# Patient Record
Sex: FEMALE | Race: WHITE
[De-identification: names, ages, dates, MRNs, and addresses within clinical notes are randomized per-mention and may not be internally consistent; named-entity substitution may affect disease eponyms.]

---

## 2019-07-02 ENCOUNTER — FORM ENCOUNTER (OUTPATIENT)
Age: 54
End: 2019-07-02

## 2019-08-08 ENCOUNTER — FORM ENCOUNTER (OUTPATIENT)
Age: 54
End: 2019-08-08

## 2021-08-16 ENCOUNTER — APPOINTMENT (OUTPATIENT)
Dept: BREAST CENTER | Facility: CLINIC | Age: 56
End: 2021-08-16

## 2022-10-10 PROBLEM — Z00.00 ENCOUNTER FOR PREVENTIVE HEALTH EXAMINATION: Status: ACTIVE | Noted: 2022-10-10

## 2024-05-29 ENCOUNTER — NON-APPOINTMENT (OUTPATIENT)
Age: 59
End: 2024-05-29

## 2024-06-11 DIAGNOSIS — Z86.69 PERSONAL HISTORY OF OTHER DISEASES OF THE NERVOUS SYSTEM AND SENSE ORGANS: ICD-10-CM

## 2024-06-11 DIAGNOSIS — Z87.891 PERSONAL HISTORY OF NICOTINE DEPENDENCE: ICD-10-CM

## 2024-06-12 DIAGNOSIS — R92.30 INCONCLUSIVE MAMMOGRAM: ICD-10-CM

## 2024-06-12 DIAGNOSIS — R92.2 INCONCLUSIVE MAMMOGRAM: ICD-10-CM

## 2024-06-12 DIAGNOSIS — N63.0 UNSPECIFIED LUMP IN UNSPECIFIED BREAST: ICD-10-CM

## 2024-06-13 ENCOUNTER — APPOINTMENT (OUTPATIENT)
Dept: BREAST CENTER | Facility: CLINIC | Age: 59
End: 2024-06-13
Payer: COMMERCIAL

## 2024-06-13 VITALS
DIASTOLIC BLOOD PRESSURE: 82 MMHG | HEART RATE: 81 BPM | SYSTOLIC BLOOD PRESSURE: 127 MMHG | HEIGHT: 62 IN | WEIGHT: 150 LBS | OXYGEN SATURATION: 97 % | BODY MASS INDEX: 27.6 KG/M2

## 2024-06-13 DIAGNOSIS — R92.8 OTHER ABNORMAL AND INCONCLUSIVE FINDINGS ON DIAGNOSTIC IMAGING OF BREAST: ICD-10-CM

## 2024-06-13 DIAGNOSIS — N60.12 DIFFUSE CYSTIC MASTOPATHY OF LEFT BREAST: ICD-10-CM

## 2024-06-13 DIAGNOSIS — R92.1 MAMMOGRAPHIC CALCIFICATION FOUND ON DIAGNOSTIC IMAGING OF BREAST: ICD-10-CM

## 2024-06-13 DIAGNOSIS — N60.11 DIFFUSE CYSTIC MASTOPATHY OF LEFT BREAST: ICD-10-CM

## 2024-06-13 DIAGNOSIS — Z80.3 FAMILY HISTORY OF MALIGNANT NEOPLASM OF BREAST: ICD-10-CM

## 2024-06-13 PROCEDURE — 99203 OFFICE O/P NEW LOW 30 MIN: CPT

## 2024-06-13 NOTE — HISTORY OF PRESENT ILLNESS
[FreeTextEntry1] : The patient is a 59-year-old nulliparous postmenopausal white female with a history of cerebral palsy who is wheelchair-bound.  She underwent menarche at age 14.  She underwent a supracervical hysterectomy and BSO in the past and has never taken any hormone replacement therapy.  She has a family history with her maternal aunt who had breast cancer in her 60s and a maternal great aunt who had breast cancer in her 50s.  She underwent 2 left breast needle core biopsies in the past which were benign.  She also underwent 2 right breast targeted ultrasound core biopsies in the 10:00 region showing sclerosing adenosis send in the right breast 12:00 region showing a complex fibroadenoma in November 2022 at [facility].  She has a history of fibrocystic breasts and comes in now after being lost to follow-up to reestablish care for routine breast cancer screening/surveillance.

## 2024-06-13 NOTE — PHYSICAL EXAM
[Normocephalic] : normocephalic [Atraumatic] : atraumatic [EOMI] : extra ocular movement intact [Supple] : supple [No Supraclavicular Adenopathy] : no supraclavicular adenopathy [No Cervical Adenopathy] : no cervical adenopathy [Examined in the supine and seated position] : examined in the supine and seated position [No dominant masses] : no dominant masses in right breast  [No dominant masses] : no dominant masses left breast [No Nipple Retraction] : no left nipple retraction [No Nipple Discharge] : no left nipple discharge [Breast Mass Right Breast ___cm] : no masses [Breast Mass Left Breast ___cm] : no masses [No Axillary Lymphadenopathy] : no left axillary lymphadenopathy [No Edema] : no edema [No Rashes] : no rashes [No Ulceration] : no ulceration [Breast Nipple Inversion] : nipples not inverted [Breast Nipple Retraction] : nipples not retracted [Breast Nipple Flattening] : nipples not flattened [Breast Nipple Fissures] : nipples not fissured [Breast Abnormal Lactation (Galactorrhea)] : no galactorrhea [Breast Abnormal Secretion Bloody Fluid] : no bloody discharge [Breast Abnormal Secretion Serous Fluid] : no serous discharge [Breast Abnormal Secretion Opalescent Fluid] : no milky discharge [de-identified] : The patient has cerebral palsy and is wheelchair-bound and has ptotic full C-cup breasts.  On palpation, I cannot feel any suspicious densities in either breast.  She has no axillary, supraclavicular, or cervical adenopathy.

## 2024-06-13 NOTE — ASSESSMENT
[FreeTextEntry1] : The patient is a 59-year-old nulliparous postmenopausal white female with a history of cerebral palsy who is wheelchair-bound.  She underwent menarche at age 14.  She underwent a supracervical hysterectomy and BSO in the past and has never taken any hormone replacement therapy.  She has a family history with her maternal aunt who had breast cancer in her 60s and a maternal great aunt who had breast cancer in her 50s.  She underwent 2 left breast needle core biopsies in the past which were benign.  She also underwent 2 right breast targeted ultrasound core biopsies in the 10:00 region showing sclerosing adenosis and in the right breast 12:00 region showing a complex fibroadenoma in November 2022 at [facility].  She has a history of fibrocystic mastopathy.  She has had some fairly stable bilateral complex cystic densities and calcifications.  She underwent her last diagnostic bilateral mammography and ultrasound which was reviewed from May 6, 2024 and performed at [facility] which showed heterogeneously dense breasts with extensive bilateral calcifications most numerous in the right breast upper outer quadrant which are stable as well as bilateral complex cystic densities for which bilateral diagnostic mammography and ultrasound is again being recommended in 6 months.  Possible MRI has also been suggested.  On exam today, she continues have ptotic D-cup breasts but I cannot feel any suspicious densities and no lymphadenopathy.  I reassured the patient that I believe she just continues have fibrocystic mastopathy with no suspicious clinical findings.  I do not believe the patient would be able to perform breast MRI since she is wheelchair-bound and tells me she cannot lay flat for extended period of time and she continues have some spastic movements due to her cerebral palsy.  I could consider contrast-enhanced mammography but for now I would just like to perform a diagnostic bilateral mammography and ultrasound again in November 2024.  I would like to see her again in 6 months in December 2024.  If her images and exam are unchanged, I think she can follow-up with me on a yearly basis and continue yearly mammography and ultrasound.  The patient tells me she was concerned and also did make an appointment at [facility] where she has a consultation as well.

## 2024-06-13 NOTE — HISTORY OF PRESENT ILLNESS
[FreeTextEntry1] : The patient is a 59-year-old nulliparous postmenopausal white female with a history of cerebral palsy who is wheelchair-bound.  She underwent menarche at age 14.  She underwent a supracervical hysterectomy and BSO in the past and has never taken any hormone replacement therapy.  She has a family history with her maternal aunt who had breast cancer in her 60s and a maternal great aunt who had breast cancer in her 50s.  She underwent 2 left breast needle core biopsies in the past which were benign.  She also underwent 2 right breast targeted ultrasound core biopsies in the 10:00 region showing sclerosing adenosis send in the right breast 12:00 region showing a complex fibroadenoma in November 2022 at [facility].  She has a history of fibrocystic breasts and comes in now after being lost to follow-up to reestablish care for routine breast cancer screening/surveillance.

## 2024-06-13 NOTE — REASON FOR VISIT
[Initial Eval - Existing Diagnosis] : an initial evaluation of an existing diagnosis [FreeTextEntry1] : The patient is a nulliparous postmenopausal white female with a history of cerebral palsy who is wheelchair-bound with a family history of breast cancer.  She has a history of fibrocystic breast and comes in to reestablish routine breast cancer screening/surveillance.

## 2024-06-13 NOTE — ADDENDUM
[FreeTextEntry1] : I spent greater than 75% of consultation in face-to-face counseling and coordination care in this patient with a history of fibrocystic mastopathy who has cerebral palsy and is wheelchair-bound with some findings on breast imaging requiring 6-month diagnostic follow-up.

## 2024-06-13 NOTE — PHYSICAL EXAM
[Normocephalic] : normocephalic [Atraumatic] : atraumatic [EOMI] : extra ocular movement intact [Supple] : supple [No Supraclavicular Adenopathy] : no supraclavicular adenopathy [No Cervical Adenopathy] : no cervical adenopathy [Examined in the supine and seated position] : examined in the supine and seated position [No dominant masses] : no dominant masses in right breast  [No dominant masses] : no dominant masses left breast [No Nipple Retraction] : no left nipple retraction [No Nipple Discharge] : no left nipple discharge [Breast Mass Right Breast ___cm] : no masses [Breast Mass Left Breast ___cm] : no masses [No Axillary Lymphadenopathy] : no left axillary lymphadenopathy [No Edema] : no edema [No Rashes] : no rashes [No Ulceration] : no ulceration [Breast Nipple Inversion] : nipples not inverted [Breast Nipple Retraction] : nipples not retracted [Breast Nipple Flattening] : nipples not flattened [Breast Nipple Fissures] : nipples not fissured [Breast Abnormal Lactation (Galactorrhea)] : no galactorrhea [Breast Abnormal Secretion Bloody Fluid] : no bloody discharge [Breast Abnormal Secretion Serous Fluid] : no serous discharge [Breast Abnormal Secretion Opalescent Fluid] : no milky discharge [de-identified] : The patient has cerebral palsy and is wheelchair-bound and has ptotic full D-cup breasts.  On palpation, I cannot feel any suspicious densities in either breast.  She has no axillary, supraclavicular, or cervical adenopathy.

## 2024-06-13 NOTE — PHYSICAL EXAM
[Normocephalic] : normocephalic [Atraumatic] : atraumatic [EOMI] : extra ocular movement intact [Supple] : supple [No Supraclavicular Adenopathy] : no supraclavicular adenopathy [No Cervical Adenopathy] : no cervical adenopathy [Examined in the supine and seated position] : examined in the supine and seated position [No dominant masses] : no dominant masses in right breast  [No dominant masses] : no dominant masses left breast [No Nipple Retraction] : no left nipple retraction [No Nipple Discharge] : no left nipple discharge [Breast Mass Right Breast ___cm] : no masses [Breast Mass Left Breast ___cm] : no masses [No Axillary Lymphadenopathy] : no left axillary lymphadenopathy [No Edema] : no edema [No Rashes] : no rashes [No Ulceration] : no ulceration [Breast Nipple Inversion] : nipples not inverted [Breast Nipple Retraction] : nipples not retracted [Breast Nipple Flattening] : nipples not flattened [Breast Nipple Fissures] : nipples not fissured [Breast Abnormal Lactation (Galactorrhea)] : no galactorrhea [Breast Abnormal Secretion Bloody Fluid] : no bloody discharge [Breast Abnormal Secretion Serous Fluid] : no serous discharge [Breast Abnormal Secretion Opalescent Fluid] : no milky discharge [de-identified] : The patient has cerebral palsy and is wheelchair-bound and has ptotic full D-cup breasts.  On palpation, I cannot feel any suspicious densities in either breast.  She has no axillary, supraclavicular, or cervical adenopathy.

## 2024-06-13 NOTE — ASSESSMENT
[FreeTextEntry1] : The patient is a 59-year-old nulliparous postmenopausal white female with a history of cerebral palsy who is wheelchair-bound.  She underwent menarche at age 14.  She underwent a supracervical hysterectomy and BSO in the past and has never taken any hormone replacement therapy.  She has a family history with her maternal aunt who had breast cancer in her 60s and a maternal great aunt who had breast cancer in her 50s.  She underwent 2 left breast needle core biopsies in the past which were benign.  She also underwent 2 right breast targeted ultrasound core biopsies in the 10:00 region showing sclerosing adenosis and in the right breast 12:00 region showing a complex fibroadenoma in November 2022 at [facility].  She has a history of fibrocystic mastopathy.  She has had some fairly stable bilateral complex cystic densities and calcifications.  She underwent her last diagnostic bilateral mammography and ultrasound which was reviewed from May 6, 2024 and performed at [facility] which showed heterogeneously dense breasts with extensive bilateral calcifications most numerous in the right breast upper outer quadrant which are stable as well as bilateral complex cystic densities for which bilateral diagnostic mammography and ultrasound is again being recommended in 6 months.  Possible MRI has also been suggested.  On exam today, she continues have ptotic D-cup breasts but I cannot feel any suspicious densities and no lymphadenopathy.  I reassured the patient that I believe she just continues have fibrocystic mastopathy with no suspicious clinical findings.  I do not believe the patient would be able to perform breast MRI since she is wheelchair-bound and tells me she cannot lay flat for extended period of time and she continues have some spastic movements due to her cerebral palsy.  I could consider contrast-enhanced mammography but for now I would just like to perform a diagnostic bilateral mammography and ultrasound again in November 2024.  I would like to see her again in 6 months in December 2024.  If her images and exam are unchanged, I think she can follow-up with me on a yearly basis and continue yearly mammography and ultrasound.  The patient tells me she was concerned and also did make an appointment at [facility] where she has a consultation as well.

## 2024-06-13 NOTE — ASSESSMENT
[FreeTextEntry1] : The patient is a 59-year-old nulliparous white female with a history of cerebral palsy who is wheelchair-bound.  She underwent menarche at age 14.  She has a family history with her maternal aunt who had breast cancer in her 60s and a maternal great aunt who had breast cancer in her 50s.  She underwent 2 left breast needle core biopsies in the past which were benign.  She also underwent 2 right breast targeted ultrasound core biopsies in the 10:00 region showing sclerosing adenosis send in the right breast 12:00 region showing a complex fibroadenoma in November 2022 at [facility].  She has a history of fibrocystic mastopathy.  She has had some fairly stable bilateral complex cystic densities.  She underwent her last diagnostic bilateral mammography and ultrasound which was reviewed from May 6, 2024 and performed at [facility] which showed heterogeneously dense breasts with extensive bilateral calcifications most numerous in the right breast upper outer quadrant which are stable as well as bilateral complex cystic densities for which bilateral diagnostic mammography and ultrasound is again being recommended in 6 months.  Possible MRI has also been suggested.  On exam today, ......... I reassured the patient that I believe she just continues have fibrocystic mastopathy with no suspicious clinical findings.  I do not believe the patient would be able to perform breast MRI but may be a candidate for contrast-enhanced mammography ????????. I would like her to get a contrast-enhanced mammography around November 2024 and if that shows no suspicious findings, she should continue with yearly clinical breast exams.

## 2024-06-13 NOTE — PAST MEDICAL HISTORY
[Postmenopausal] : The patient is postmenopausal [Menarche Age ____] : age at menarche was [unfilled] [Total Preg ___] : G[unfilled] [Live Births ___] : P[unfilled]  [History of Hormone Replacement Treatment] : has no history of hormone replacement treatment

## 2024-06-13 NOTE — HISTORY OF PRESENT ILLNESS
[FreeTextEntry1] : The patient is a 59-year-old nulliparous white female with a history of cerebral palsy who is wheelchair-bound.  She underwent menarche at age 14.  She has a family history with her maternal aunt who had breast cancer in her 60s and a maternal great aunt who had breast cancer in her 50s.  She underwent 2 left breast needle core biopsies in the past which were benign.  She also underwent 2 right breast targeted ultrasound core biopsies in the 10:00 region showing sclerosing adenosis send in the right breast 12:00 region showing a complex fibroadenoma in November 2022 at [facility].  She has a history of fibrocystic breasts and comes in now after being lost to follow-up to reestablish care for routine breast cancer screening/surveillance.

## 2024-10-14 ENCOUNTER — NON-APPOINTMENT (OUTPATIENT)
Age: 59
End: 2024-10-14

## 2024-11-21 ENCOUNTER — NON-APPOINTMENT (OUTPATIENT)
Age: 59
End: 2024-11-21

## 2024-11-26 ENCOUNTER — RESULT REVIEW (OUTPATIENT)
Age: 59
End: 2024-11-26

## 2024-11-27 ENCOUNTER — NON-APPOINTMENT (OUTPATIENT)
Age: 59
End: 2024-11-27

## 2025-04-25 ENCOUNTER — APPOINTMENT (OUTPATIENT)
Dept: BREAST CENTER | Facility: CLINIC | Age: 60
End: 2025-04-25
Payer: COMMERCIAL

## 2025-04-25 VITALS — HEIGHT: 62 IN | WEIGHT: 145 LBS | BODY MASS INDEX: 26.68 KG/M2

## 2025-04-25 DIAGNOSIS — Z12.31 ENCOUNTER FOR SCREENING MAMMOGRAM FOR MALIGNANT NEOPLASM OF BREAST: ICD-10-CM

## 2025-04-25 DIAGNOSIS — Z80.3 FAMILY HISTORY OF MALIGNANT NEOPLASM OF BREAST: ICD-10-CM

## 2025-04-25 DIAGNOSIS — N60.11 DIFFUSE CYSTIC MASTOPATHY OF LEFT BREAST: ICD-10-CM

## 2025-04-25 DIAGNOSIS — N60.12 DIFFUSE CYSTIC MASTOPATHY OF LEFT BREAST: ICD-10-CM

## 2025-04-25 PROCEDURE — 99213 OFFICE O/P EST LOW 20 MIN: CPT

## 2025-05-09 ENCOUNTER — TRANSCRIPTION ENCOUNTER (OUTPATIENT)
Age: 60
End: 2025-05-09

## 2025-05-13 ENCOUNTER — NON-APPOINTMENT (OUTPATIENT)
Age: 60
End: 2025-05-13

## 2025-06-10 ENCOUNTER — NON-APPOINTMENT (OUTPATIENT)
Age: 60
End: 2025-06-10

## 2025-06-12 ENCOUNTER — APPOINTMENT (OUTPATIENT)
Dept: HEMATOLOGY ONCOLOGY | Facility: CLINIC | Age: 60
End: 2025-06-12